# Patient Record
Sex: MALE | Race: WHITE | NOT HISPANIC OR LATINO | URBAN - METROPOLITAN AREA
[De-identification: names, ages, dates, MRNs, and addresses within clinical notes are randomized per-mention and may not be internally consistent; named-entity substitution may affect disease eponyms.]

---

## 2022-07-08 ENCOUNTER — OUTPATIENT (OUTPATIENT)
Dept: OUTPATIENT SERVICES | Facility: HOSPITAL | Age: 62
LOS: 1 days | Discharge: HOME | End: 2022-07-08

## 2022-07-08 DIAGNOSIS — R40.0 SOMNOLENCE: ICD-10-CM

## 2022-07-08 DIAGNOSIS — R35.1 NOCTURIA: ICD-10-CM

## 2022-07-08 DIAGNOSIS — Z12.11 ENCOUNTER FOR SCREENING FOR MALIGNANT NEOPLASM OF COLON: ICD-10-CM

## 2022-07-08 PROCEDURE — 71271 CT THORAX LUNG CANCER SCR C-: CPT | Mod: 26

## 2023-02-24 ENCOUNTER — OUTPATIENT (OUTPATIENT)
Dept: INPATIENT UNIT | Facility: HOSPITAL | Age: 63
LOS: 1 days | Discharge: HOME | End: 2023-02-24
Payer: COMMERCIAL

## 2023-02-24 VITALS
DIASTOLIC BLOOD PRESSURE: 87 MMHG | RESPIRATION RATE: 14 BRPM | OXYGEN SATURATION: 95 % | HEART RATE: 76 BPM | SYSTOLIC BLOOD PRESSURE: 134 MMHG

## 2023-02-24 VITALS
SYSTOLIC BLOOD PRESSURE: 151 MMHG | TEMPERATURE: 97 F | HEART RATE: 80 BPM | RESPIRATION RATE: 18 BRPM | DIASTOLIC BLOOD PRESSURE: 95 MMHG

## 2023-02-24 DIAGNOSIS — Z98.890 OTHER SPECIFIED POSTPROCEDURAL STATES: Chronic | ICD-10-CM

## 2023-02-24 DIAGNOSIS — K63.5 POLYP OF COLON: ICD-10-CM

## 2023-02-24 DIAGNOSIS — D12.5 BENIGN NEOPLASM OF SIGMOID COLON: ICD-10-CM

## 2023-02-24 PROCEDURE — 45390 COLONOSCOPY W/RESECTION: CPT

## 2023-02-24 PROCEDURE — 88305 TISSUE EXAM BY PATHOLOGIST: CPT | Mod: 26

## 2023-02-24 PROCEDURE — 45381 COLONOSCOPY SUBMUCOUS NJX: CPT | Mod: XU

## 2023-02-24 PROCEDURE — 88305 TISSUE EXAM BY PATHOLOGIST: CPT

## 2023-02-24 PROCEDURE — 45384 COLONOSCOPY W/LESION REMOVAL: CPT | Mod: XU

## 2023-02-24 PROCEDURE — C1889: CPT

## 2023-02-24 RX ORDER — METRONIDAZOLE 500 MG
1 TABLET ORAL
Qty: 15 | Refills: 0
Start: 2023-02-24 | End: 2023-02-28

## 2023-02-24 RX ORDER — CIPROFLOXACIN LACTATE 400MG/40ML
1 VIAL (ML) INTRAVENOUS
Qty: 10 | Refills: 0
Start: 2023-02-24 | End: 2023-02-28

## 2023-02-24 NOTE — ASU DISCHARGE PLAN (ADULT/PEDIATRIC) - NS MD DC FALL RISK RISK
For information on Fall & Injury Prevention, visit: [email or web address] OR  [email or web address] OR  https://www.cdc.gov/steadi/patient.html

## 2023-02-24 NOTE — ASU PATIENT PROFILE, ADULT - FALL HARM RISK - UNIVERSAL INTERVENTIONS
Bed in lowest position, wheels locked, appropriate side rails in place/Call bell, personal items and telephone in reach/Instruct patient to call for assistance before getting out of bed or chair/Non-slip footwear when patient is out of bed/Phenix to call system/Physically safe environment - no spills, clutter or unnecessary equipment/Purposeful Proactive Rounding/Room/bathroom lighting operational, light cord in reach

## 2023-02-24 NOTE — ASU DISCHARGE PLAN (ADULT/PEDIATRIC) - ASU DC SPECIAL INSTRUCTIONSFT
-clear liquid diet for 24 hours  -do not take aspirin, or NSAIDs like alleve, Ibuprofen, proxen...     -Come To Emergency right away for:  • Severe or new onset abdominal pain that doesn’t improve by passing gas  • Rectal bleeding that turns the entire toilet bowel red  • Fever greater than 101.5 or chills

## 2023-02-24 NOTE — ASU DISCHARGE PLAN (ADULT/PEDIATRIC) - CALL YOUR DOCTOR IF YOU HAVE ANY OF THE FOLLOWING:
09/08/21   Reviewed 4 wheeled walker w/seat request with Tamica Tang CNP. Due to patient's weakness, unsteady gait and risk for falls, she agrees that use of this device would be beneficial for patient in promoting safety and independence with ADL's within and outside of the home. DME order signed and faxed to:  [facility]  Phone:[phone]  Fax: [phone]  Yelena Montiel RN     Bleeding that does not stop/Swelling that gets worse/Pain not relieved by Medications/Fever greater than (need to indicate Fahrenheit or Celsius)/Nausea and vomiting that does not stop/Inability to tolerate liquids or foods

## 2023-02-24 NOTE — H&P PST ADULT - ASSESSMENT
62 y.o male patient is presented today for Colonoscopy with EMR of cecal polyp with path positive for Adenoma with HGD

## 2023-02-24 NOTE — ASU DISCHARGE PLAN (ADULT/PEDIATRIC) - CARE PROVIDER_API CALL
Javier Galicia)  Gastroenterology; Internal Medicine  [address] [place] [address]  Phone: [phone]  Fax: [phone]  Follow Up Time: 2 weeks

## 2023-02-24 NOTE — CHART NOTE - NSCHARTNOTEFT_GEN_A_CORE
PACU ANESTHESIA ADMISSION NOTE      Procedure:   Post op diagnosis:      ____  Intubated  TV:______       Rate: ______      FiO2: ______    __x__  Patent Airway    ____  Full return of protective reflexes    ____  Full recovery from anesthesia / back to baseline     Vitals:   T:    98       R:    12              BP:       87/52            Sat:  95%                P:  65      Mental Status:  __x__ Awake   _____ Alert   _____ Drowsy   _____ Sedated    Nausea/Vomiting:  __x__ NO  ______Yes,   See Post - Op Orders          Pain Scale (0-10):  _____    Treatment: ____ None    ___x_ See Post - Op/PCA Orders    Post - Operative Fluids:   ____ Oral   ___x_ See Post - Op Orders    Plan: Discharge:   ____Home       ___x__Floor     _____Critical Care    _____  Other:_________________    Comments: Uneventful intraoperative course. No anesthesia issues or complications noted.  Patient stable upon arrival to PACU. Report given to RN. Discharge when criteria met.

## 2023-02-27 LAB — SURGICAL PATHOLOGY STUDY: SIGNIFICANT CHANGE UP

## 2023-03-01 DIAGNOSIS — F17.210 NICOTINE DEPENDENCE, CIGARETTES, UNCOMPLICATED: ICD-10-CM

## 2023-03-01 DIAGNOSIS — K21.9 GASTRO-ESOPHAGEAL REFLUX DISEASE WITHOUT ESOPHAGITIS: ICD-10-CM

## 2023-03-01 DIAGNOSIS — C18.0 MALIGNANT NEOPLASM OF CECUM: ICD-10-CM

## 2023-03-01 DIAGNOSIS — D12.0 BENIGN NEOPLASM OF CECUM: ICD-10-CM

## 2024-11-18 NOTE — ASU PREOP CHECKLIST - DENTURES
Name from pharmacy: MELOXICAM 15 MG TABLET          Will file in chart as: meloxicam (MOBIC) 15 MG tablet    Sig: Take 1 tablet by mouth daily as needed for Pain.    Original sig: TAKE 1 TABLET BY MOUTH DAILY AS NEEDED FOR PAIN    Disp: 30 tablet    Refills: 3 (Pharmacy requested: Not specified)    Start: 11/16/2024    Class: Eprescribe    Last ordered: 4 months ago (7/19/2024) by Pavel Hong MD    Last refill: 10/23/2024    Rx #: [id number]    NSAID Refill Protocol - 12 Month Protocol [id number]/16/2024 11:14 AM   Protocol Details HGB greater than 10 and HCT greater than 30 in past 12 months looking at last value    Patient is less than 69 years old    Lab requirements -- IF CRITERIA FAILED REFER TO PROTOCOL DETAILS    Seen by prescribing provider or same department within the last 12 months or has a future appt in 3 months - IF FAILED PLEASE LOOK AT CHART REVIEW FOR LAST VISIT AND PROCEED ACCORDINGLY    eGFR greater than 29 within last 12 months looking at last value    AST less than 55 in past 12 months looking at last value    Normal Potassium within last 12 months looking at last value    ALT less than 90 in past 12 months looking at last value    Medication (including dose and sig) on current meds list    Request is NOT for Ketorolac      To be filled at: [facility] [id number] - [place] [address]     Next ov: 05/05/25     Patient is less than 68 years old      Lab requirements -- IF CRITERIA FAILED REFER TO PROTOCOL DETAILS    If the ALT, AST, eGFR, HCT, HGB, or Potassium have not been resulted in the required timeframe, order missing lab(s), give courtesy refill x 3 months, and notify patient that labs are due   lab orders already placed and advised     Routed to provider for approval of refill     
no